# Patient Record
Sex: FEMALE | Race: WHITE | NOT HISPANIC OR LATINO | Employment: UNEMPLOYED | URBAN - METROPOLITAN AREA
[De-identification: names, ages, dates, MRNs, and addresses within clinical notes are randomized per-mention and may not be internally consistent; named-entity substitution may affect disease eponyms.]

---

## 2017-01-11 ENCOUNTER — ALLSCRIPTS OFFICE VISIT (OUTPATIENT)
Dept: OTHER | Facility: OTHER | Age: 57
End: 2017-01-11

## 2017-02-13 ENCOUNTER — GENERIC CONVERSION - ENCOUNTER (OUTPATIENT)
Dept: OTHER | Facility: OTHER | Age: 57
End: 2017-02-13

## 2017-03-30 ENCOUNTER — GENERIC CONVERSION - ENCOUNTER (OUTPATIENT)
Dept: OTHER | Facility: OTHER | Age: 57
End: 2017-03-30

## 2017-07-13 ENCOUNTER — ALLSCRIPTS OFFICE VISIT (OUTPATIENT)
Dept: OTHER | Facility: OTHER | Age: 57
End: 2017-07-13

## 2017-07-13 DIAGNOSIS — Z12.31 ENCOUNTER FOR SCREENING MAMMOGRAM FOR MALIGNANT NEOPLASM OF BREAST: ICD-10-CM

## 2017-09-19 ENCOUNTER — GENERIC CONVERSION - ENCOUNTER (OUTPATIENT)
Dept: OTHER | Facility: OTHER | Age: 57
End: 2017-09-19

## 2017-09-21 ENCOUNTER — ALLSCRIPTS OFFICE VISIT (OUTPATIENT)
Dept: OTHER | Facility: OTHER | Age: 57
End: 2017-09-21

## 2017-09-22 ENCOUNTER — ALLSCRIPTS OFFICE VISIT (OUTPATIENT)
Dept: OTHER | Facility: OTHER | Age: 57
End: 2017-09-22

## 2017-09-29 ENCOUNTER — GENERIC CONVERSION - ENCOUNTER (OUTPATIENT)
Dept: OTHER | Facility: OTHER | Age: 57
End: 2017-09-29

## 2017-10-10 ENCOUNTER — GENERIC CONVERSION - ENCOUNTER (OUTPATIENT)
Dept: OTHER | Facility: OTHER | Age: 57
End: 2017-10-10

## 2017-11-15 ENCOUNTER — GENERIC CONVERSION - ENCOUNTER (OUTPATIENT)
Dept: FAMILY MEDICINE CLINIC | Facility: CLINIC | Age: 57
End: 2017-11-15

## 2018-01-12 NOTE — RESULT NOTES
Message   Cholesterol is elevated  Follow at [address]  Verified Results  (1) CBC/PLT/DIFF [id number] 07:46AM [facility] [place]     Test Name Result Flag Reference   WBC 6 1 x10E3/uL  3 4-10 8   RBC 4 50 x10E6/uL  3 77-5 28   Hemoglobin 13 4 g/dL  11 1-15 9   Hematocrit 40 9 %  34 0-46  6   MCV 91 fL  79-97   MCH 29 8 pg  26 6-33 0   MCHC 32 8 g/dL  31 5-35 7   RDW 14 5 %  12 3-15 4   Platelets 096 N15U5/UE  150-379   Neutrophils 52 %     Lymphs 32 %     Monocytes 8 %     Eos 7 %     Basos 1 %     Neutrophils (Absolute) 3 2 x10E3/uL  1 4-7 0   Lymphs (Absolute) 2 0 x10E3/uL  0 7-3 1   Monocytes(Absolute) 0 5 x10E3/uL  0 1-0 9   Eos (Absolute) 0 4 x10E3/uL  0 0-0 4   Baso (Absolute) 0 0 x10E3/uL  0 0-0 2   Immature Granulocytes 0 %     Immature Grans (Abs) 0 0 x10E3/uL  0 0-0 1     (1) LIPID PANEL, FASTING [id number] 07:46AM [employer] [place]     Test Name Result Flag Reference   Cholesterol, Total 221 mg/dL H 100-199   Triglycerides 127 mg/dL  0-149   HDL Cholesterol 53 mg/dL  >39   VLDL Cholesterol Sanket 25 mg/dL  5-40   LDL Cholesterol Calc 143 mg/dL H 0-99   T  Chol/HDL Ratio 4 2 ratio units  0 0-4 4   T  Chol/HDL Ratio                                                             Men  Women                                               1/2 Avg  Risk  3 4    3 3                                                   Avg Risk  5 0    4 4                                                2X Avg  Risk  9 6    7 1                                                3X Avg  Risk 23 4   11 0     (1) COMPREHENSIVE METABOLIC PANEL [id number] 10:45IF [facility] [place]     Test Name Result Flag Reference   Glucose, Serum 115 mg/dL H 65-99   BUN 14 mg/dL  6-24   Creatinine, Serum 0 86 mg/dL  0 57-1 00   eGFR If NonAfricn Am 77 mL/min/1 73  >59   eGFR If Africn Am 89 mL/min/1 73  >59   BUN/Creatinine Ratio 16  9-23   Sodium, Serum 142 mmol/L  134-144   Potassium, Serum 4 2 mmol/L  3 5-5 2   Chloride, Serum 100 mmol/L     Carbon Dioxide, Total 27 mmol/L 18-29   Calcium, Serum 9 4 mg/dL  8 7-10 2   Protein, Total, Serum 6 8 g/dL  6 0-8 5   Albumin, Serum 4 3 g/dL  3 5-5 5   Globulin, Total 2 5 g/dL  1 5-4 5   A/G Ratio 1 7  1 2-2 2   **Please note reference interval change**   Bilirubin, Total 0 3 mg/dL  0 0-1 2   Alkaline Phosphatase, S 98 IU/L     AST (SGOT) 14 IU/L  0-40   ALT (SGPT) 12 IU/L  0-32

## 2018-01-13 VITALS
HEART RATE: 72 BPM | TEMPERATURE: 98.1 F | RESPIRATION RATE: 16 BRPM | SYSTOLIC BLOOD PRESSURE: 130 MMHG | WEIGHT: 205.2 LBS | HEIGHT: 69 IN | BODY MASS INDEX: 30.39 KG/M2 | DIASTOLIC BLOOD PRESSURE: 80 MMHG

## 2018-01-13 VITALS
WEIGHT: 202.03 LBS | HEIGHT: 69 IN | SYSTOLIC BLOOD PRESSURE: 130 MMHG | DIASTOLIC BLOOD PRESSURE: 100 MMHG | BODY MASS INDEX: 29.92 KG/M2

## 2018-01-13 NOTE — PROGRESS NOTES
Active Problems    1  Benign colon polyp (211 3) (K63 5)   2  Cataract of both eyes, unspecified cataract type (366 9) (H26 9)   3  Cervical cancer screening (V76 2) (Z12 4)   4  Colon cancer screening (V76 51) (Z12 11)   5  Dietary calcium deficiency (269 3) (E58)   6  Encounter for gynecological examination without abnormal finding (V72 31) (Z01 419)   7  Osteoporosis screening (V82 81) (Z13 820)   8  Post-menopausal bleeding (627 1) (N95 0)   9  Visit for screening mammogram (V76 12) (Z12 31)    Current Meds   1  No Reported Medications Recorded    Allergies    1  No Known Drug Allergies    Message   Recorded as Task   Date: 10/06/2017 07:56 AM, Created By: Sindhu Yanes   Task Name: Rosetta Maradiaga To: Sindhu Yanes   Regarding Patient: Jenni Villatoro, Status: Active   Comment:    Aliza Cortes - 06 Oct 2017 7:56 AM     Verify task generated in Scan batch basket  Darin Morrissey - 10 Oct 2017 2:33 PM     TASK REPLIED TO: Previously Assigned To Darin Morrissey        10/10/17 1430:  pt aware f benign results with possibility of polyp  Pt counseled, however, that if bleeding persists would recommend hysteroscopy/D&C to assure nothing has been missed by bx   BEO     Signatures   Electronically signed by : OPAL Haque ; Oct 10 2017  8:17PM EST                       (Author)

## 2018-01-14 VITALS
BODY MASS INDEX: 29.77 KG/M2 | HEIGHT: 69 IN | DIASTOLIC BLOOD PRESSURE: 90 MMHG | SYSTOLIC BLOOD PRESSURE: 150 MMHG | WEIGHT: 201 LBS

## 2018-01-22 VITALS
BODY MASS INDEX: 30.07 KG/M2 | HEIGHT: 69 IN | WEIGHT: 203 LBS | DIASTOLIC BLOOD PRESSURE: 100 MMHG | SYSTOLIC BLOOD PRESSURE: 150 MMHG

## 2018-06-19 ENCOUNTER — OFFICE VISIT (OUTPATIENT)
Dept: FAMILY MEDICINE CLINIC | Facility: CLINIC | Age: 58
End: 2018-06-19
Payer: COMMERCIAL

## 2018-06-19 VITALS
DIASTOLIC BLOOD PRESSURE: 80 MMHG | BODY MASS INDEX: 30.06 KG/M2 | SYSTOLIC BLOOD PRESSURE: 132 MMHG | WEIGHT: 203 LBS | HEART RATE: 68 BPM

## 2018-06-19 DIAGNOSIS — Z00.00 HEALTHCARE MAINTENANCE: Primary | ICD-10-CM

## 2018-06-19 DIAGNOSIS — H25.9 AGE-RELATED CATARACT OF BOTH EYES, UNSPECIFIED AGE-RELATED CATARACT TYPE: ICD-10-CM

## 2018-06-19 DIAGNOSIS — K63.5 BENIGN COLON POLYP: ICD-10-CM

## 2018-06-19 DIAGNOSIS — E78.2 MIXED HYPERLIPIDEMIA: ICD-10-CM

## 2018-06-19 PROBLEM — H26.9 CATARACT OF BOTH EYES: Status: ACTIVE | Noted: 2017-01-11

## 2018-06-19 PROBLEM — N95.0 POST-MENOPAUSAL BLEEDING: Status: ACTIVE | Noted: 2017-09-21

## 2018-06-19 PROBLEM — E78.5 HYPERLIPIDEMIA: Status: ACTIVE | Noted: 2018-06-19

## 2018-06-19 PROCEDURE — 99396 PREV VISIT EST AGE 40-64: CPT | Performed by: FAMILY MEDICINE

## 2018-06-19 NOTE — PROGRESS NOTES
Assessment/Plan:    Hyperlipidemia  Check labs  Has been elevated at times  Check  Has been 1 year last time  Cataract of both eyes  S/P removal    Benign colon polyp  Follow as scheduled  Diagnoses and all orders for this visit:    Healthcare maintenance  Comments:  Doing well  no concerns  Recommend Flu yearly  Orders:  -     TSH, 3rd generation; Future  -     CBC and differential; Future    Mixed hyperlipidemia  -     Comprehensive metabolic panel; Future  -     Lipid Panel with Direct LDL reflex; Future    Benign colon polyp    Age-related cataract of both eyes, unspecified age-related cataract type          Subjective: patient here for a yearly physical  No problems  ak     Patient ID: Harleen Fraga is a 66 y o  female  She is doing well  Denies issues  Reviewed chart vs before  The following portions of the patient's history were reviewed and updated as appropriate: allergies, current medications, past family history, past medical history, past social history, past surgical history and problem list     Review of Systems   Constitutional: Negative  HENT: Negative  Eyes: Negative  Respiratory: Negative  Cardiovascular: Negative  Gastrointestinal: Negative  Endocrine: Negative  Genitourinary: Negative  Musculoskeletal: Negative  Skin: Negative  Allergic/Immunologic: Negative  Neurological: Negative  Hematological: Negative  Psychiatric/Behavioral: Negative  Objective:      /80   Pulse 68   Wt 92 1 kg (203 lb)   Breastfeeding? No   BMI 30 06 kg/m²          Physical Exam   Constitutional: She is oriented to person, place, and time  She appears well-developed and well-nourished  No distress  HENT:   Head: Normocephalic and atraumatic  Mouth/Throat: Oropharynx is clear and moist  No oropharyngeal exudate  Eyes: Conjunctivae and EOM are normal  Pupils are equal, round, and reactive to light  Neck: Normal range of motion   Neck supple  No tracheal deviation present  No thyromegaly present  Cardiovascular: Normal rate, regular rhythm, normal heart sounds and intact distal pulses  Exam reveals no gallop and no friction rub  No murmur heard  Pulmonary/Chest: Effort normal and breath sounds normal  No respiratory distress  She has no wheezes  She has no rales  Abdominal: Soft  Bowel sounds are normal  She exhibits no distension  There is no tenderness  Neurological: She is alert and oriented to person, place, and time  Coordination normal    Skin: Skin is warm and dry  She is not diaphoretic  Psychiatric: She has a normal mood and affect  Her behavior is normal  Judgment and thought content normal    Nursing note and vitals reviewed

## 2018-06-19 NOTE — PATIENT INSTRUCTIONS
Problem List Items Addressed This Visit        Digestive    Benign colon polyp     Follow as scheduled  Other    Cataract of both eyes     S/P removal         Hyperlipidemia     Check labs  Has been elevated at times  Check  Has been 1 year last time  Relevant Orders    Comprehensive metabolic panel    Lipid Panel with Direct LDL reflex      Other Visit Diagnoses     Healthcare maintenance    -  Primary    Doing well  no concerns  Recommend Flu yearly      Relevant Orders    TSH, 3rd generation    CBC and differential

## 2018-06-21 LAB
ALBUMIN SERPL-MCNC: 4.6 G/DL (ref 3.5–5.5)
ALBUMIN/GLOB SERPL: 1.9 {RATIO} (ref 1.2–2.2)
ALP SERPL-CCNC: 90 IU/L (ref 39–117)
ALT SERPL-CCNC: 15 IU/L (ref 0–32)
AMBIG ABBREV DEFAULT: NORMAL
AMBIG ABBREV DEFAULT: NORMAL
AST SERPL-CCNC: 16 IU/L (ref 0–40)
BASOPHILS # BLD AUTO: 0 X10E3/UL (ref 0–0.2)
BASOPHILS # BLD AUTO: 0 X10E3/UL (ref 0–0.2)
BASOPHILS NFR BLD AUTO: 1 %
BASOPHILS NFR BLD AUTO: 1 %
BILIRUB SERPL-MCNC: 0.3 MG/DL (ref 0–1.2)
BUN SERPL-MCNC: 14 MG/DL (ref 6–24)
BUN/CREAT SERPL: 15 (ref 9–23)
CALCIUM SERPL-MCNC: 9.3 MG/DL (ref 8.7–10.2)
CHLORIDE SERPL-SCNC: 103 MMOL/L (ref 96–106)
CHOLEST SERPL-MCNC: 208 MG/DL (ref 100–199)
CHOLEST/HDLC SERPL: 4.5 RATIO (ref 0–4.4)
CO2 SERPL-SCNC: 24 MMOL/L (ref 20–29)
CREAT SERPL-MCNC: 0.91 MG/DL (ref 0.57–1)
EOSINOPHIL # BLD AUTO: 0.5 X10E3/UL (ref 0–0.4)
EOSINOPHIL # BLD AUTO: 0.5 X10E3/UL (ref 0–0.4)
EOSINOPHIL NFR BLD AUTO: 8 %
EOSINOPHIL NFR BLD AUTO: 8 %
ERYTHROCYTE [DISTWIDTH] IN BLOOD BY AUTOMATED COUNT: 14.6 % (ref 12.3–15.4)
ERYTHROCYTE [DISTWIDTH] IN BLOOD BY AUTOMATED COUNT: 14.6 % (ref 12.3–15.4)
GLOBULIN SER-MCNC: 2.4 G/DL (ref 1.5–4.5)
GLUCOSE SERPL-MCNC: 111 MG/DL (ref 65–99)
HCT VFR BLD AUTO: 41.2 % (ref 34–46.6)
HCT VFR BLD AUTO: 41.2 % (ref 34–46.6)
HDLC SERPL-MCNC: 46 MG/DL
HGB BLD-MCNC: 13.6 G/DL (ref 11.1–15.9)
HGB BLD-MCNC: 13.6 G/DL (ref 11.1–15.9)
IMM GRANULOCYTES # BLD: 0 X10E3/UL (ref 0–0.1)
IMM GRANULOCYTES # BLD: 0 X10E3/UL (ref 0–0.1)
IMM GRANULOCYTES NFR BLD: 0 %
IMM GRANULOCYTES NFR BLD: 0 %
LDLC SERPL DIRECT ASSAY-MCNC: 144 MG/DL (ref 0–99)
LYMPHOCYTES # BLD AUTO: 2.3 X10E3/UL (ref 0.7–3.1)
LYMPHOCYTES # BLD AUTO: 2.3 X10E3/UL (ref 0.7–3.1)
LYMPHOCYTES NFR BLD AUTO: 36 %
LYMPHOCYTES NFR BLD AUTO: 36 %
MCH RBC QN AUTO: 30.2 PG (ref 26.6–33)
MCH RBC QN AUTO: 30.2 PG (ref 26.6–33)
MCHC RBC AUTO-ENTMCNC: 33 G/DL (ref 31.5–35.7)
MCHC RBC AUTO-ENTMCNC: 33 G/DL (ref 31.5–35.7)
MCV RBC AUTO: 91 FL (ref 79–97)
MCV RBC AUTO: 91 FL (ref 79–97)
MONOCYTES # BLD AUTO: 0.5 X10E3/UL (ref 0.1–0.9)
MONOCYTES # BLD AUTO: 0.5 X10E3/UL (ref 0.1–0.9)
MONOCYTES NFR BLD AUTO: 8 %
MONOCYTES NFR BLD AUTO: 8 %
NEUTROPHILS # BLD AUTO: 3.1 X10E3/UL (ref 1.4–7)
NEUTROPHILS # BLD AUTO: 3.1 X10E3/UL (ref 1.4–7)
NEUTROPHILS NFR BLD AUTO: 47 %
NEUTROPHILS NFR BLD AUTO: 47 %
PLATELET # BLD AUTO: 192 X10E3/UL (ref 150–379)
PLATELET # BLD AUTO: 192 X10E3/UL (ref 150–379)
POTASSIUM SERPL-SCNC: 4.4 MMOL/L (ref 3.5–5.2)
PROT SERPL-MCNC: 7 G/DL (ref 6–8.5)
RBC # BLD AUTO: 4.51 X10E6/UL (ref 3.77–5.28)
RBC # BLD AUTO: 4.51 X10E6/UL (ref 3.77–5.28)
SL AMB EGFR AFRICAN AMERICAN: 82 ML/MIN/1.73
SL AMB EGFR NON AFRICAN AMERICAN: 71 ML/MIN/1.73
SODIUM SERPL-SCNC: 144 MMOL/L (ref 134–144)
TRIGL SERPL-MCNC: 142 MG/DL (ref 0–149)
TSH SERPL DL<=0.005 MIU/L-ACNC: 2.75 UIU/ML (ref 0.45–4.5)
WBC # BLD AUTO: 6.5 X10E3/UL (ref 3.4–10.8)
WBC # BLD AUTO: 6.5 X10E3/UL (ref 3.4–10.8)

## 2018-09-04 ENCOUNTER — ANNUAL EXAM (OUTPATIENT)
Dept: OBGYN CLINIC | Facility: CLINIC | Age: 58
End: 2018-09-04
Payer: COMMERCIAL

## 2018-09-04 VITALS
OXYGEN SATURATION: 98 % | HEART RATE: 70 BPM | HEIGHT: 69 IN | SYSTOLIC BLOOD PRESSURE: 124 MMHG | WEIGHT: 202 LBS | DIASTOLIC BLOOD PRESSURE: 80 MMHG | BODY MASS INDEX: 29.92 KG/M2

## 2018-09-04 DIAGNOSIS — E58 DIETARY CALCIUM DEFICIENCY: ICD-10-CM

## 2018-09-04 DIAGNOSIS — Z12.11 COLON CANCER SCREENING: ICD-10-CM

## 2018-09-04 DIAGNOSIS — Z72.3 INADEQUATE EXERCISE: ICD-10-CM

## 2018-09-04 DIAGNOSIS — Z12.31 VISIT FOR SCREENING MAMMOGRAM: ICD-10-CM

## 2018-09-04 DIAGNOSIS — Z01.419 ENCOUNTER FOR ANNUAL ROUTINE GYNECOLOGICAL EXAMINATION: Primary | ICD-10-CM

## 2018-09-04 PROBLEM — N95.0 POST-MENOPAUSAL BLEEDING: Status: RESOLVED | Noted: 2017-09-21 | Resolved: 2018-09-04

## 2018-09-04 PROBLEM — H26.9 CATARACT OF BOTH EYES: Status: RESOLVED | Noted: 2017-01-11 | Resolved: 2018-09-04

## 2018-09-04 PROCEDURE — 99396 PREV VISIT EST AGE 40-64: CPT | Performed by: OBSTETRICS & GYNECOLOGY

## 2018-09-04 NOTE — PROGRESS NOTES
Pt is a63 y o   ,menopausal, who presents for preventive care  Partner same ; lifetime  <5 partners         safe sexual practices followed:        does feel safe in relationship:    Dairy: 4 oz milk daily, 3 oz cheese daily, occ yogurt, green veggies  Vitamin D: none  Exercise: 2-3x/week  Colonoscopy: --benign polyp, 2017-wnl, told to repeat in 10 years  DEXA: n/a  safety at home: yes  tobacco use N    Last pap: --wnl, repeat   Mammogram: 2017--repeat this year  Past Medical History:   Diagnosis Date    Age-related cataract of both eyes     Post-menopausal bleeding     2017--EB benign    Varicella        Past Surgical History:   Procedure Laterality Date    CATARACT EXTRACTION, BILATERAL       then 2017    COLONOSCOPY W/ POLYPECTOMY      repeat colonoscopy --wnl    SALPINGOOPHORECTOMY Left 2005    LSO: benign serous cystadenoma    TONSILLECTOMY      WISDOM TOOTH EXTRACTION         Ob Hx:   OB History    Para Term  AB Living   1 1 1     1   SAB TAB Ectopic Multiple Live Births                  # Outcome Date GA Lbr Jose/2nd Weight Sex Delivery Anes PTL Lv   1 Term               Obstetric Comments   Menarche: 17   Menopause: 50         No current outpatient prescriptions on file  No Known Allergies    Social History     Social History    Marital status: /Civil Union     Spouse name: Alfonso Jane Number of children: 1    Years of education: college     Occupational History    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      Social History Main Topics    Smoking status: Never Smoker    Smokeless tobacco: Never Used    Alcohol use 0 6 - 1 2 oz/week     1 - 2 Glasses of wine per week    Drug use: No    Sexual activity: Yes     Partners: Male     Birth control/ protection: Post-menopausal      Comment: lifetime partners: 5; : > 25 years     Other Topics Concern    None     Social History Narrative    [facility],     Primary language [facility]      Cheondoism: no preference Accepts blood products        Exercise: 2-3x/week     Calcium: 4 oz milk daily, 3 oz of cheese daily occasional yogurt       Family History   Problem Relation Age of Onset    Hypertension Mother     Other Father         Chronic back pain and herniated discs--pain medication dependent    Stroke Father 58    Arthritis Sister     Colon cancer Maternal Uncle 79    Breast cancer Neg Hx     Ovarian cancer Neg Hx        Blood pressure 124/80, pulse 70, height 5' 8 9" (1 75 m), weight 91 6 kg (202 lb), SpO2 98 %, not currently breastfeeding  and Body mass index is 29 92 kg/m²  Physical Exam   Constitutional: She is oriented to person, place, and time  She appears well-developed and well-nourished  HENT:   Head: Normocephalic and atraumatic  Eyes: Conjunctivae and EOM are normal    Neck: Normal range of motion  Neck supple  No tracheal deviation present  No thyromegaly present  Cardiovascular: Normal rate, regular rhythm and normal heart sounds  Pulmonary/Chest: Effort normal and breath sounds normal  No stridor  No respiratory distress  She has no wheezes  She has no rales  Abdominal: Soft  Bowel sounds are normal  She exhibits no distension and no mass  There is no tenderness  There is no rebound and no guarding  Musculoskeletal: Normal range of motion  She exhibits no edema or tenderness  Lymphadenopathy:     She has no cervical adenopathy  Neurological: She is alert and oriented to person, place, and time  Skin: Skin is warm  No rash noted  No erythema  Psychiatric: She has a normal mood and affect  Her behavior is normal  Judgment and thought content normal      Breasts: breasts appear normal, no suspicious masses, no skin or nipple changes or axillary nodes, symmetric fibrous changes in both upper outer quadrants      Pelvic exam: VULVA: normal appearing vulva with no masses, tenderness or lesions, mild erythema--pt denies symptoms, VAGINA: normal appearing vagina with normal color and discharge, no lesions, CERVIX: normal appearing cervix without discharge or lesions, multiparous os, UTERUS: uterus is normal size, shape, consistency and nontender, ADNEXA: normal adnexa in size, nontender and no masses, RECTAL: normal rectal, no masses  A/P:  Pt is a 66 y o   with       Diagnoses and all orders for this visit:    Encounter for annual routine gynecological examination    Visit for screening mammogram  -     Mammo screening bilateral w cad; Future    Dietary calcium deficiency    Inadequate exercise    Colon cancer screening  -     Occult Blood, Fecal Immunochemical; Future        Patient advised recommendation of daily dietary calcium of  1200 mg calcium  Patient advised recommendation of exercise 5 times per week for 30 minutes  Patient advised recommendation of BMI to be between 19-25

## 2024-07-18 NOTE — MISCELLANEOUS
Message  9/25/17 1935: LMOM to call back  Endo width 8 mm so recommend endometrial biopsy  BEO  9/26/17 1030: pt bassem call and aware of above  Embx scheduled   BEO      Signatures   Electronically signed by : OPAL Blue ; Sep 26 2017  2:44PM EST                       (Author)
[facility]